# Patient Record
Sex: FEMALE | Race: WHITE | Employment: FULL TIME | URBAN - METROPOLITAN AREA
[De-identification: names, ages, dates, MRNs, and addresses within clinical notes are randomized per-mention and may not be internally consistent; named-entity substitution may affect disease eponyms.]

---

## 2022-12-15 ENCOUNTER — OFFICE VISIT (OUTPATIENT)
Dept: ENT CLINIC | Age: 47
End: 2022-12-15

## 2022-12-15 VITALS
OXYGEN SATURATION: 96 % | HEART RATE: 78 BPM | DIASTOLIC BLOOD PRESSURE: 87 MMHG | HEIGHT: 63 IN | BODY MASS INDEX: 38.45 KG/M2 | SYSTOLIC BLOOD PRESSURE: 126 MMHG | TEMPERATURE: 97.1 F | WEIGHT: 217 LBS

## 2022-12-15 DIAGNOSIS — R42 VERTIGO: Primary | ICD-10-CM

## 2022-12-15 DIAGNOSIS — H81.10 BENIGN PAROXYSMAL POSITIONAL VERTIGO, UNSPECIFIED LATERALITY: ICD-10-CM

## 2022-12-15 DIAGNOSIS — H93.11 TINNITUS OF RIGHT EAR: ICD-10-CM

## 2022-12-15 DIAGNOSIS — H91.93 BILATERAL HEARING LOSS, UNSPECIFIED HEARING LOSS TYPE: ICD-10-CM

## 2022-12-15 PROCEDURE — 99203 OFFICE O/P NEW LOW 30 MIN: CPT | Performed by: STUDENT IN AN ORGANIZED HEALTH CARE EDUCATION/TRAINING PROGRAM

## 2022-12-15 NOTE — PROGRESS NOTES
[address] [facility]  NEW PATIENT HISTORY AND PHYSICAL NOTE      Patient Name: Valerie Leahy  Medical Record Number:  [id number]  Primary Care Physician:  No primary care provider on file. ChiefComplaint     Chief Complaint   Patient presents with    Dizziness     Has been dizzy for a year and half, Chiropractor saw something in the Rt ear, both ear have a ringing, Rt ear feels clogged        History of Present Illness     Valerie Leahy is an 52 y.o. female presenting with vertigo. She had an episode of vertigo on 7/19/2021, described as intense room spinning with nausea and vomiting. She eventually went to the ER and was hospitalized for this. It took her about 3 weeks for the vertigo resolved. She was seen by an ENT at [facility] who performed a repositioning maneuver which helped somewhat. No audiogram was performed at that time. No referral to physical therapy was performed. She follows with a chiropractor who looked in her ears and stated that they may have seen something abnormal in her right ear. Since her initial episode of intense vertigo she now gets episodic vertigo, episodes will last approximately 30 seconds. 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 We will also get vertigo when she rolls over in bed, tying her shoes, going to sit down. These episodes are nowhere near as intense as her initial episode. Endorses chronic gradual hearing loss over the years    Denies recent changes in hearing. Constant right sided tinnitus. Feels like right ear is clogged for the past year and a half. No reported allergens, no nasal spray. No otalgia. No otorrhea. No history of chronic ear infections. No history of otologic surgery. No family history of early onset hearing loss. No loud noise exposures. Denies exposure to chemotherapy or long-term IV antibiotics. No reported serious head trauma.        Past Medical History History reviewed. No pertinent past medical history. Past Surgical History     History reviewed. No pertinent surgical history. Family History     History reviewed. No pertinent family history. Social History     Social History     Tobacco Use    Smoking status: Never    Smokeless tobacco: Never        Allergies     No Known Allergies    Medications     No current outpatient medications on file. No current facility-administered medications for this visit. Review of Systems     REVIEW OF SYSTEMS    See HPI Above    PhysicalExam     Vitals:    12/15/22 0849   BP: 126/87   Pulse: 78   Temp: 97.1 °F (36.2 °C)   TempSrc: Temporal   SpO2: 96%   Weight: 217 lb (98.4 kg)   Height: 5' 3\" (1.6 m)       PHYSICAL EXAM  /87   Pulse 78   Temp 97.1 °F (36.2 °C) (Temporal)   Ht 5' 3\" (1.6 m)   Wt 217 lb (98.4 kg)   SpO2 96%   BMI 38.44 kg/m²     GENERAL: No acute distress, alert and oriented  EYES: EOMI, Anti-icteric. No resting or gaze nystagmus. NOSE: On anterior rhinoscopy there is no epistaxis, nasal mucosa moist and normal appearing, no purulent drainage. EARS: Normal external appearance; on portable otomicroscopy:     -Ad: External auditory canal without stenosis, tympanic membrane clear, no middle ear effusions or retractions.      -As: External auditory canal without stenosis, tympanic membrane clear, no middle ear effusions or retractions.    Pneumatic otoscopy: Bilateral tympanic membranes mobile pneumatic otoscopy  FACE: HB 1/6 bilaterally, symmetric appearing, sensation equal bilaterally  ORAL CAVITY: No masses or lesions visualized or palpated, uvula is midline, moist mucous membranes, no oropharyngeal masses or oropharyngeal obstruction  NECK: Normal range of motion, no thyromegaly, trachea is midline, no palpable lymphadenopathy or neck masses, no crepitus  NEURO: Cranial Nerves 2, 3, 4, 5, 6, 7, 11, 12 intact bilaterally   [place]: No torsional nystagmus noted upon bilateral-sided testing. I have performed a head and neck physical exam personally or was physically present during the key or critical portions of the service. Assessment and Plan     1. Vertigo  -Suspect that her initial vertigo occurring in July 2021 was likely secondary to vestibular neuronitis given its time course. This is since resolved but her episodic vertigo that last 30 seconds and precipitated by bending over and head motions is more likely related to underlying BPPV. 2. Benign paroxysmal positional vertigo, unspecified laterality  -Referral placed to vestibular rehab. She will follow-up in 2 months for reevaluation  - [facility]    3. Bilateral hearing loss, unspecified hearing loss type  4. Tinnitus of right ear  -Needs comprehensive audiological evaluation for work-up of right-sided ear fullness, right tinnitus, vertigo. This will be performed at her next follow-up in 2 months. - Audiometry with tympanometry; Future    Follow Up     Return in about 2 months (around 2/15/2023). Angel Herrera   Department of Otolaryngology/Head & Neck Surgery  12/15/22    Medical Decision Making: The following items were considered in medical decision making:  Independent review of images  Review / order clinical lab tests  Review / order radiology tests  Decision to obtain old records    This note was generated completely or in part utilizing Dragon dictation speech recognition software. Occasionally, words are mistranscribed and despite editing, the text may contain inaccuracies due to incorrect word recognition. If further clarification is needed please contact the office at [phone].

## 2023-01-03 ENCOUNTER — HOSPITAL ENCOUNTER (OUTPATIENT)
Dept: PHYSICAL THERAPY | Age: 48
Setting detail: THERAPIES SERIES
Discharge: HOME | End: 2023-01-03

## 2023-01-03 NOTE — FLOWSHEET NOTE
[address]     Physical Therapy  Cancellation/No-show Note  Patient Name:  Sami Andersen  :  1975   Date:  1/3/2023  Cancelled visits to date: 0  No-shows to date: 1 - EVAL    Patient status for today's appointment patient:  []  Cancelled  []  Rescheduled appointment  [x]  No-show 1/3/23     Reason given by patient:  []  Patient ill  []  Conflicting appointment  []  No transportation    []  Conflict with work  [x]  No reason given  []  Other:     Comments:      Phone call information:   []  Phone call made today to patient at _ time at number provided:      []  Patient answered, conversation as follows:    []  Patient did not answer, message left as follows:  [x]  Phone call not made today  []  Phone call not needed - pt contacted us to cancel and provided reason for cancellation.      Electronically signed by:  Latrell Hernandez PT

## 2023-02-06 DIAGNOSIS — H91.90 HEARING LOSS, UNSPECIFIED HEARING LOSS TYPE, UNSPECIFIED LATERALITY: Primary | ICD-10-CM

## 2025-06-06 ENCOUNTER — AMBULATORY SURGICAL CENTER (OUTPATIENT)
Dept: URBAN - METROPOLITAN AREA SURGERY 7 | Facility: SURGERY | Age: 50
End: 2025-06-06

## 2025-06-06 VITALS
DIASTOLIC BLOOD PRESSURE: 59 MMHG | HEART RATE: 77 BPM | OXYGEN SATURATION: 98 % | SYSTOLIC BLOOD PRESSURE: 124 MMHG | SYSTOLIC BLOOD PRESSURE: 128 MMHG | HEART RATE: 68 BPM | SYSTOLIC BLOOD PRESSURE: 114 MMHG | DIASTOLIC BLOOD PRESSURE: 59 MMHG | DIASTOLIC BLOOD PRESSURE: 64 MMHG | SYSTOLIC BLOOD PRESSURE: 147 MMHG | SYSTOLIC BLOOD PRESSURE: 118 MMHG | TEMPERATURE: 97.5 F | OXYGEN SATURATION: 94 % | RESPIRATION RATE: 18 BRPM | HEART RATE: 74 BPM | DIASTOLIC BLOOD PRESSURE: 73 MMHG | DIASTOLIC BLOOD PRESSURE: 80 MMHG | RESPIRATION RATE: 21 BRPM | HEART RATE: 71 BPM | SYSTOLIC BLOOD PRESSURE: 120 MMHG | OXYGEN SATURATION: 99 % | RESPIRATION RATE: 16 BRPM | SYSTOLIC BLOOD PRESSURE: 124 MMHG | DIASTOLIC BLOOD PRESSURE: 106 MMHG | DIASTOLIC BLOOD PRESSURE: 65 MMHG | DIASTOLIC BLOOD PRESSURE: 83 MMHG | OXYGEN SATURATION: 97 % | SYSTOLIC BLOOD PRESSURE: 119 MMHG | HEART RATE: 69 BPM | SYSTOLIC BLOOD PRESSURE: 106 MMHG | WEIGHT: 198 LBS | RESPIRATION RATE: 24 BRPM | RESPIRATION RATE: 17 BRPM | RESPIRATION RATE: 22 BRPM | HEIGHT: 63 IN | DIASTOLIC BLOOD PRESSURE: 75 MMHG | HEART RATE: 68 BPM | RESPIRATION RATE: 22 BRPM | SYSTOLIC BLOOD PRESSURE: 119 MMHG | SYSTOLIC BLOOD PRESSURE: 114 MMHG | WEIGHT: 198 LBS | SYSTOLIC BLOOD PRESSURE: 136 MMHG | DIASTOLIC BLOOD PRESSURE: 83 MMHG | HEART RATE: 73 BPM | TEMPERATURE: 97.5 F | SYSTOLIC BLOOD PRESSURE: 128 MMHG | OXYGEN SATURATION: 94 % | RESPIRATION RATE: 17 BRPM | HEART RATE: 76 BPM | DIASTOLIC BLOOD PRESSURE: 106 MMHG | DIASTOLIC BLOOD PRESSURE: 75 MMHG | RESPIRATION RATE: 24 BRPM | SYSTOLIC BLOOD PRESSURE: 147 MMHG | HEART RATE: 69 BPM | DIASTOLIC BLOOD PRESSURE: 73 MMHG | HEIGHT: 63 IN | OXYGEN SATURATION: 99 % | RESPIRATION RATE: 16 BRPM | HEART RATE: 76 BPM | DIASTOLIC BLOOD PRESSURE: 60 MMHG | DIASTOLIC BLOOD PRESSURE: 60 MMHG | OXYGEN SATURATION: 96 % | HEART RATE: 71 BPM | DIASTOLIC BLOOD PRESSURE: 64 MMHG | SYSTOLIC BLOOD PRESSURE: 106 MMHG | HEART RATE: 60 BPM | HEART RATE: 73 BPM | OXYGEN SATURATION: 97 % | HEART RATE: 74 BPM | DIASTOLIC BLOOD PRESSURE: 80 MMHG | RESPIRATION RATE: 21 BRPM | HEART RATE: 60 BPM | RESPIRATION RATE: 23 BRPM | RESPIRATION RATE: 23 BRPM | OXYGEN SATURATION: 98 % | SYSTOLIC BLOOD PRESSURE: 120 MMHG | HEART RATE: 77 BPM | OXYGEN SATURATION: 96 % | SYSTOLIC BLOOD PRESSURE: 118 MMHG | DIASTOLIC BLOOD PRESSURE: 65 MMHG | SYSTOLIC BLOOD PRESSURE: 136 MMHG | RESPIRATION RATE: 18 BRPM

## 2025-06-06 DIAGNOSIS — Z12.11 ENCOUNTER FOR SCREENING FOR MALIGNANT NEOPLASM OF COLON: ICD-10-CM

## 2025-06-06 DIAGNOSIS — K57.30 DIVERTICULOSIS OF LARGE INTESTINE WITHOUT PERFORATION OR ABS: ICD-10-CM

## 2025-06-06 PROCEDURE — 45378 DIAGNOSTIC COLONOSCOPY: CPT | Mod: 33 | Performed by: INTERNAL MEDICINE
